# Patient Record
Sex: MALE | Race: WHITE | NOT HISPANIC OR LATINO | Employment: FULL TIME | URBAN - METROPOLITAN AREA
[De-identification: names, ages, dates, MRNs, and addresses within clinical notes are randomized per-mention and may not be internally consistent; named-entity substitution may affect disease eponyms.]

---

## 2023-09-20 ENCOUNTER — OFFICE VISIT (OUTPATIENT)
Dept: ORTHOPEDIC SURGERY | Facility: CLINIC | Age: 33
End: 2023-09-20
Payer: COMMERCIAL

## 2023-09-20 VITALS
BODY MASS INDEX: 24.46 KG/M2 | WEIGHT: 184.6 LBS | SYSTOLIC BLOOD PRESSURE: 120 MMHG | HEIGHT: 73 IN | DIASTOLIC BLOOD PRESSURE: 82 MMHG

## 2023-09-20 DIAGNOSIS — M76.891 POPLITEUS TENDINITIS OF RIGHT LOWER EXTREMITY: Primary | ICD-10-CM

## 2023-09-20 PROCEDURE — 99204 OFFICE O/P NEW MOD 45 MIN: CPT | Performed by: ORTHOPAEDIC SURGERY

## 2023-09-20 RX ORDER — DICLOFENAC SODIUM 75 MG/1
TABLET, DELAYED RELEASE ORAL
COMMUNITY
Start: 2023-09-15

## 2023-09-20 RX ORDER — COLCHICINE 0.6 MG/1
TABLET ORAL
COMMUNITY
Start: 2023-07-13

## 2023-09-20 RX ORDER — FEBUXOSTAT 40 MG/1
TABLET, FILM COATED ORAL
COMMUNITY
Start: 2023-09-19

## 2023-09-20 RX ORDER — NAPROXEN 250 MG/1
250 TABLET ORAL AS NEEDED
COMMUNITY

## 2023-09-20 RX ORDER — IBUPROFEN 200 MG
200 TABLET ORAL EVERY 6 HOURS PRN
COMMUNITY

## 2023-09-20 NOTE — PROGRESS NOTES
[facility] Note    Subjective     Chief Complaint   Patient presents with    Right Knee - Pain        HPI    Norbert Lopez is a 33 y.o. male who presents with new problem of: right knee pain.  Onset: atraumatic and gradual in nature. The issue has been ongoing for 7 months. Pain is a 1/10 on the pain scale. Pain is described as dull. Associated symptoms include pain, swelling, and stiffness. The pain is worse with leisure; resting, ice, heat, medications and elevating the extremity improve the pain. Previous treatments have included: bracing, NSAIDS, oral steroids, and steroid injection (last injection 07/2023).  Pain is on the posterior lateral aspect of the knee.  Occurs intermittently.  Previous episodes of gout in his knee.  No pain anteromedially.  No mechanical symptoms.    I have reviewed the following portions of the patient's history and agree with: History of Present Illness and Review of Systems    There is no problem list on file for this patient.    Past Medical History:   Diagnosis Date    Knee swelling 2023    Gout      Past Surgical History:   Procedure Laterality Date    KNEE SURGERY  06/2009    Left knee ACL Recon      Family History   Problem Relation Age of Onset    Rheumatologic disease Mother         Gout    Rheumatologic disease Brother         Gout     Social History     Socioeconomic History    Marital status: Single   Tobacco Use    Smoking status: Former     Packs/day: 0.50     Years: 10.00     Pack years: 5.00     Types: Cigarettes   Vaping Use    Vaping Use: Never used    Passive vaping exposure: Yes   Substance and Sexual Activity    Alcohol use: Yes     Alcohol/week: 12.0 standard drinks     Types: 12 Cans of beer per week    Drug use: Yes     Types: Marijuana    Sexual activity: Yes     Partners: Female     Birth control/protection: Pill      Current Outpatient Medications on File Prior to Visit   Medication Sig Dispense Refill    colchicine 0.6 MG tablet TAKE 1  TABLET BY MOUTH DAILY FOR GOUT PREVENTION      diclofenac (VOLTAREN) 75 MG EC tablet TAKE 1 TABLET BY MOUTH TWICE DAILY FOR JOINT PAIN OR STIFFNESS      febuxostat (ULORIC) 40 MG tablet       ibuprofen (ADVIL,MOTRIN) 200 MG tablet Take 1 tablet by mouth Every 6 (Six) Hours As Needed.      naproxen (NAPROSYN) 250 MG tablet Take 1 tablet by mouth As Needed.       No current facility-administered medications on file prior to visit.      Allergies   Allergen Reactions    Erythromycin Hives    Allopurinol Rash        Review of Systems   Constitutional:  Negative for activity change, appetite change, chills, diaphoresis, fatigue, fever and unexpected weight change.   HENT:  Negative for congestion, dental problem, drooling, ear discharge, ear pain, facial swelling, hearing loss, mouth sores, nosebleeds, postnasal drip, rhinorrhea, sinus pressure, sneezing, sore throat, tinnitus, trouble swallowing and voice change.    Eyes:  Negative for photophobia, pain, discharge, redness, itching and visual disturbance.   Respiratory:  Negative for apnea, cough, choking, chest tightness, shortness of breath, wheezing and stridor.    Cardiovascular:  Negative for chest pain, palpitations and leg swelling.   Gastrointestinal:  Negative for abdominal distention, abdominal pain, anal bleeding, blood in stool, constipation, diarrhea, nausea, rectal pain and vomiting.   Endocrine: Negative for cold intolerance, heat intolerance, polydipsia, polyphagia and polyuria.   Genitourinary:  Negative for decreased urine volume, difficulty urinating, dysuria, enuresis, flank pain, frequency, genital sores, hematuria and urgency.   Musculoskeletal:  Positive for arthralgias. Negative for back pain, gait problem, joint swelling, myalgias, neck pain and neck stiffness.   Skin:  Negative for color change, pallor, rash and wound.   Allergic/Immunologic: Negative for environmental allergies, food allergies and immunocompromised state.   Neurological:   "Negative for dizziness, tremors, seizures, syncope, facial asymmetry, speech difficulty, weakness, light-headedness, numbness and headaches.   Hematological:  Negative for adenopathy. Does not bruise/bleed easily.   Psychiatric/Behavioral:  Negative for agitation, behavioral problems, confusion, decreased concentration, dysphoric mood, hallucinations, self-injury, sleep disturbance and suicidal ideas. The patient is not nervous/anxious and is not hyperactive.       Objective      Physical Exam  /82   Ht 184.2 cm (72.5\")   Wt 83.7 kg (184 lb 9.6 oz)   BMI 24.69 kg/m²     Body mass index is 24.69 kg/m².    General:   Mental Status:  Alert   Appearance: Cooperative, in no acute distress   Build and Nutrition: Well-nourished well-developed male   Orientation: Alert and oriented to person, place and time   Posture: Normal   Gait: Nonantalgic/normal    Integument:   Right knee: No skin lesions, no rash, no ecchymosis    Neurologic:   Sensation:    Right foot: Intact to light touch on the dorsal and plantar aspect   Motor:  Right lower extremity: 5/5 quadriceps, hamstrings, ankle dorsiflexors, and ankle plantar flexors    Vascular:   Right lower extremity: 2+ dorsalis pedis pulse, prompt capillary refill    Lower Extremities:   Right Knee:    Tenderness:  None    Effusion:  None    Swelling:  None    Crepitus:  None    Atrophy:  None    Range of motion:  Extension: 0°       Flexion: 140°  Instability:  No varus laxity, no valgus laxity, negative anterior drawer  Deformities:  None      Imaging/Studies      Imaging Results (Last 24 Hours)       Procedure Component Value Units Date/Time    XR Knee 3+ View With [place] Right [[id number]] Resulted: 09/20/23 1018     Updated: 09/20/23 1019    Narrative:      Right Knee Radiographs  Indication: right knee pain  Views: AP, lateral, and sunrise views of the right knee    Comparison: no prior studies available    Findings:   No acute or chronic bony abnormalities with good " alignment is seen.             MRI of the right knee from 7/12/2023 from [facility] an open MRI was reviewed, which showed irregularity of the anterior horn of the medial meniscus, possible tear, with tendinopathy of the popliteus tendon.    Assessment and Plan     Diagnoses and all orders for this visit:    1. Popliteus tendinitis of right lower extremity (Primary)  -     XR Knee 3+ View With Sunrise Right        1. Popliteus tendinitis of right lower extremity        I reviewed my findings with the patient.  I did review his MRI from [facility] an open MRI from 7/12/2023.  He is having no symptoms medially, and intermittent symptoms posterolaterally.  I think most of his issues are coming from the popliteus tendon, and have recommended conservative treatment.  The small meniscal abnormality seen on MRI anteromedially may or may not represent a tear, and I will be happy to see him back in the future if he has any worsening symptoms.  Patient was happy for the second opinion today.    Return if symptoms worsen or fail to improve.      Casey Alvarado MD  09/20/23  10:47 EDT

## 2023-10-03 ENCOUNTER — PATIENT ROUNDING (BHMG ONLY) (OUTPATIENT)
Dept: ORTHOPEDIC SURGERY | Facility: CLINIC | Age: 33
End: 2023-10-03
Payer: COMMERCIAL

## 2023-10-03 NOTE — PROGRESS NOTES
October 3, 2023    Teddy Toussaint!    My name is Ronnell Gibbs Supervisor at [facility] located at  [address] [place] [id number]      I am writing  to officially welcome you to our practice and ask about your recent visit.     Tell me about your visit with us. What things went well?        We're always looking for ways to make our patients' experiences even better. Do you have recommendations on ways we may improve?      Overall were you satisfied with your first visit to our practice?        I appreciate you taking the time to answer these questions today. Please let me know if there is anything else I can do for you.       Thank you, and have a great day.

## 2024-05-14 RX ORDER — COLCHICINE 0.6 MG/1
TABLET ORAL
Qty: 30 TABLET | Refills: 0 | Status: SHIPPED | OUTPATIENT
Start: 2024-05-14

## 2024-06-17 RX ORDER — COLCHICINE 0.6 MG/1
TABLET ORAL
Qty: 30 TABLET | Refills: 3 | Status: SHIPPED | OUTPATIENT
Start: 2024-06-17

## 2024-06-17 NOTE — TELEPHONE ENCOUNTER
Rx Refill Note  Requested Prescriptions     Pending Prescriptions Disp Refills    colchicine 0.6 MG tablet [Pharmacy Med Name: Colchicine 0.6mg Tablet] 30 tablet 0     Sig: Take 1 tablet by mouth once daily for gout prevention.      Last office visit with prescribing clinician: 04/17/2024     Last telemedicine visit with prescribing clinician: Visit date not found   Next office visit with prescribing clinician: 10/16/2024                         Would you like a call back once the refill request has been completed: [] Yes [] No    If the office needs to give you a call back, can they leave a voicemail: [] Yes [] No    Armando Corona MA  06/17/24, 09:00 EDT    Last labs 4/25/24

## 2024-08-16 RX ORDER — DICLOFENAC SODIUM 75 MG/1
TABLET, DELAYED RELEASE ORAL
Qty: 60 TABLET | Refills: 1 | Status: SHIPPED | OUTPATIENT
Start: 2024-08-16

## 2024-11-25 ENCOUNTER — OFFICE VISIT (OUTPATIENT)
Age: 34
End: 2024-11-25
Payer: COMMERCIAL

## 2024-11-25 VITALS
HEART RATE: 95 BPM | HEIGHT: 72 IN | SYSTOLIC BLOOD PRESSURE: 132 MMHG | TEMPERATURE: 97.9 F | DIASTOLIC BLOOD PRESSURE: 86 MMHG | WEIGHT: 206.6 LBS | BODY MASS INDEX: 27.98 KG/M2

## 2024-11-25 DIAGNOSIS — R79.89 LFT ELEVATION: ICD-10-CM

## 2024-11-25 DIAGNOSIS — M1A.09X0 CHRONIC GOUT OF MULTIPLE SITES, UNSPECIFIED CAUSE: Primary | ICD-10-CM

## 2024-11-25 DIAGNOSIS — Z98.890 HISTORY OF REPAIR OF ACL: ICD-10-CM

## 2024-11-25 PROCEDURE — 99214 OFFICE O/P EST MOD 30 MIN: CPT | Performed by: INTERNAL MEDICINE

## 2024-11-25 RX ORDER — DICLOFENAC SODIUM 75 MG/1
75 TABLET, DELAYED RELEASE ORAL 2 TIMES DAILY PRN
Qty: 180 TABLET | Refills: 1 | Status: SHIPPED | OUTPATIENT
Start: 2024-11-25

## 2024-11-25 RX ORDER — FEBUXOSTAT 40 MG/1
40 TABLET, FILM COATED ORAL DAILY
Qty: 90 TABLET | Refills: 1 | Status: SHIPPED | OUTPATIENT
Start: 2024-11-25

## 2024-11-25 NOTE — ASSESSMENT & PLAN NOTE
"\"Terry\", single with girlfriend, plays hockey, , travel to [place]   Recurrent episodic severe flares ongoing since 2015 including podagra great toe  Most recent flare right knee early June 2023 for which he was on crutches initially  [facility] drained right knee 6/23 (Jesus Manuel CINTRON) with synovial fluid reportedly positive uric acid crystals  Labs 6/8/23:  Uric acid 8.3, normal CBC, normal CMP  X-ray right knee 6/8/23-joint effusion, MRI right knee June 2023  +Brother and mother with gout  **Current:  Febuxostat 40 mg start 7/13/23, as needed colchicine, diclofenac  Prior intolerance allopurinol 2021 with rash hand.      No recent gout flares.  Doing very well clinically  Uric acid 6.7 on 4/25/2024  Doing well today on febuxostat 40 mg daily.  Continue this.  Refilled.  -just take the colchicine and diclofenac as needed  Labs ordered today for monitoring as below  -Follow-up PCP for mild LFT elevation history     Recommend Mediterranean diet and avoidance of alcohol, shellfish, red meat, soda, processed foods, sugar which can all trigger gout flares.  Goal uric acid in gout is below 6, which is the saturation point of uric acid.  Patient can use steroid taper as needed for acute flares.  Return to clinic 6 months      Orders:    febuxostat (ULORIC) 40 MG tablet; Take 1 tablet by mouth Daily.    diclofenac (VOLTAREN) 75 MG EC tablet; Take 1 tablet by mouth 2 (Two) Times a Day As Needed (joint pain).    C-reactive Protein; Future    CBC Auto Differential; Future    Comprehensive Metabolic Panel; Future    Sedimentation Rate; Future    Uric Acid; Future    "

## 2024-11-25 NOTE — PROGRESS NOTES
Office Follow Up      Date: 11/25/2024   Patient Name: Norbert Lopez  MRN: [id number]  YOB: 1990    Referring Physician: Ramesh Shelton A*     Chief Complaint:   Chief Complaint   Patient presents with    Gout       History of Present Illness: Norbert Lopez is a 34 y.o. male who is here today for follow up on gout    Patient reports longstanding history episodic gouty flares dating back to 2015.  He reports that the flares typically occur episodically with sudden onset, usually involving one lower extremity joint at a time ,often his great toe or knee.  He notes that eating pork products seemed to trigger flares.  His most recent flare started right knee early June 2023 with sudden onset pain stiffness swelling in the right knee.  No injury to the knee.  He initially had to use crutches because of the severity of the pain in the right knee.  Touching it with a sheet was painful.  The right knee was initially swollen and hot with a effusion.  He went to urgent care twice as well as his PCP about this severe knee pain.  He was given prednisone with partial improvement in the knee pain and swelling.  He then consulted with [facility] in June 2023 and had effusion drained from right knee.  Reportedly synovial fluid from the knee showed evidence of uric acid crystals.  Labs with his PCP June 2023 showed elevated uric acid 8.3.  He has had no pain or swelling in the upper extremity joints.  No psoriasis.  His mother and brother also suffer from gout.  He does drink alcohol but not excessively.    He uses tart cherry juice supplement over-the-counter.   He reports prior intolerance to allopurinol due to rash on his hand in 2021.    Interim 11/25/2024:  Doing well today on febuxostat and as needed colchicine and diclofenac.  No gout flares in the interim  No side effects to medicine.        Subjective       Review of Systems: Review of Systems   Constitutional:   Negative for chills, fatigue, fever and unexpected weight loss.   HENT:  Negative for mouth sores, sinus pressure and sore throat.    Eyes:  Negative for pain and redness.   Respiratory:  Negative for cough and shortness of breath.    Cardiovascular:  Negative for chest pain.   Gastrointestinal:  Negative for abdominal pain, blood in stool, diarrhea, nausea, vomiting and GERD.   Endocrine: Negative for polydipsia and polyuria.   Genitourinary:  Negative for dysuria, genital sores and hematuria.   Musculoskeletal:  Negative for arthralgias, back pain, joint swelling, myalgias, neck pain and neck stiffness.   Skin:  Negative for rash and bruise.   Allergic/Immunologic: Negative for immunocompromised state.   Neurological:  Negative for seizures, weakness, numbness and memory problem.   Hematological:  Negative for adenopathy. Does not bruise/bleed easily.   Psychiatric/Behavioral:  Negative for depressed mood. The patient is not nervous/anxious.         Medications:   Current Outpatient Medications:     colchicine 0.6 MG tablet, Take 1 tablet by mouth once daily for gout prevention., Disp: 30 tablet, Rfl: 3    diclofenac (VOLTAREN) 75 MG EC tablet, Take 1 tablet by mouth 2 (Two) Times a Day As Needed (joint pain)., Disp: 180 tablet, Rfl: 1    febuxostat (ULORIC) 40 MG tablet, Take 1 tablet by mouth Daily., Disp: 90 tablet, Rfl: 1    omeprazole (priLOSEC) 40 MG capsule, Take 1 capsule by mouth Daily. Before a meal, Disp: , Rfl:     Allergies:   Allergies   Allergen Reactions    Allopurinol Rash    Erythromycin Hives       I have reviewed and updated the patient's chief complaint, history of present illness, review of systems, past medical history, surgical history, family history, social history, medications and allergy list as appropriate.     Objective        Vital Signs:   Vitals:    11/25/24 1637   BP: 132/86   BP Location: Left arm   Patient Position: Sitting   Cuff Size: Adult   Pulse: 95   Temp: 97.9 °F (36.6  "°C)   Weight: 93.7 kg (206 lb 9.6 oz)   Height: 182.9 cm (72\")   PainSc:   1     Body mass index is 28.02 kg/m².      Physical Exam:  Physical Exam   MUSCULOSKELETAL:   No peripheral synovitis    Complete joint exam was performed including the MCPs, PIPs, DIPs of the hands, wrists, elbows, shoulders, hips, knees and ankles.  No soft tissue swelling or tenderness is present except as above.    General: The patient is well-developed and well nourished. Cooperative, alert and oriented. Affect is normal. Hydration appears normal.   HEENT: Normocephalic and atraumatic. Lids and conjunctiva are normal. Pupils are equal and sclera are clear. Oropharynx is clear   NECK neck is supple without adenopathy, masses or thyromegaly.   CARDIOVASCULAR: Regular rate and rhythm. No murmurs, rubs or gallops   LUNGS: Effort is normal. Lungs are clear bilateral   ABDOMEN: Not examined  EXTREMITIES: Peripheral pulses are intact. No clubbing.   SKIN: No rashes. No subcutaneous nodules. No digital ulcers. No sclerodactyly.   NEUROLOGIC: Gait is normal. Strength testing is normal.  No focal neurologic deficits    Results Review:   Labs:   No results found for: \"GLUCOSE\", \"BUN\", \"CREATININE\", \"EGFRRESULT\", \"EGFR\", \"BCR\", \"K\", \"CO2\", \"CALCIUM\", \"PROTENTOTREF\", \"ALBUMIN\", \"BILITOT\", \"AST\", \"ALT\"  No results found for: \"WBC\", \"HGB\", \"HCT\", \"MCV\", \"PLT\"  No results found for: \"SEDRATE\"  No results found for: \"CRP\"  No results found for: \"QUANTIFERO\", \"QUANTITB1\", \"QUANTITB2\", \"QUANTIFERN\", \"QUANTIFERM\", \"QUANTITBGLDP\"  No results found for: \"RF\"  No results found for: \"HEPBSAG\", \"HEPAIGM\", \"HEPBIGMCORE\", \"HEPCVIRUSABY\"      Procedures    Assessment / Plan        Assessment & Plan  Chronic gout of multiple sites, unspecified cause  \"Terry\", single with girlfriend, plays hockey, , travel to [place]   Recurrent episodic severe flares ongoing since 2015 including podagra great toe  Most recent flare right knee early June 2023 for " which he was on crutches initially  [facility] drained right knee 6/23 (Jesus Manuel CINTRON) with synovial fluid reportedly positive uric acid crystals  Labs 6/8/23:  Uric acid 8.3, normal CBC, normal CMP  X-ray right knee 6/8/23-joint effusion, MRI right knee June 2023  +Brother and mother with gout  **Current:  Febuxostat 40 mg start 7/13/23, as needed colchicine, diclofenac  Prior intolerance allopurinol 2021 with rash hand.      No recent gout flares.  Doing very well clinically  Uric acid 6.7 on 4/25/2024  Doing well today on febuxostat 40 mg daily.  Continue this.  Refilled.  -just take the colchicine and diclofenac as needed  Labs ordered today for monitoring as below  -Follow-up PCP for mild LFT elevation history     Recommend Mediterranean diet and avoidance of alcohol, shellfish, red meat, soda, processed foods, sugar which can all trigger gout flares.  Goal uric acid in gout is below 6, which is the saturation point of uric acid.  Patient can use steroid taper as needed for acute flares.  Return to clinic 6 months      Orders:    febuxostat (ULORIC) 40 MG tablet; Take 1 tablet by mouth Daily.    diclofenac (VOLTAREN) 75 MG EC tablet; Take 1 tablet by mouth 2 (Two) Times a Day As Needed (joint pain).    C-reactive Protein; Future    CBC Auto Differential; Future    Comprehensive Metabolic Panel; Future    Sedimentation Rate; Future    Uric Acid; Future    History of repair of ACL  History of 2009 left knee ACL repair; orthopedics dr zuñiga.       LFT elevation  Labs 4/25/2024: Negative viral hepatitis panel,negative JEREL, normal iron studies, negative mitochondrial antibody/smooth muscle antibody    Mild LFT elevation ALT/AST 10/12/2023 and 4/25/2025 labs  - Avoid alcohol  Follow-up with PCP           Follow Up:   Return in about 6 months (around 5/25/2025) for Followup APRN.        Simón Jeter MD  [facility]

## 2025-03-28 DIAGNOSIS — M1A.09X0 CHRONIC GOUT OF MULTIPLE SITES, UNSPECIFIED CAUSE: ICD-10-CM

## 2025-03-28 RX ORDER — FEBUXOSTAT 40 MG/1
TABLET, FILM COATED ORAL
Qty: 90 TABLET | Refills: 0 | Status: SHIPPED | OUTPATIENT
Start: 2025-03-28

## 2025-05-29 ENCOUNTER — TELEPHONE (OUTPATIENT)
Age: 35
End: 2025-05-29
Payer: COMMERCIAL

## 2025-05-29 DIAGNOSIS — M1A.09X0 CHRONIC GOUT OF MULTIPLE SITES, UNSPECIFIED CAUSE: ICD-10-CM

## 2025-05-29 RX ORDER — DICLOFENAC SODIUM 75 MG/1
75 TABLET, DELAYED RELEASE ORAL 2 TIMES DAILY PRN
Qty: 180 TABLET | Refills: 1 | Status: SHIPPED | OUTPATIENT
Start: 2025-05-29

## 2025-05-29 RX ORDER — COLCHICINE 0.6 MG/1
TABLET ORAL
Qty: 30 TABLET | Refills: 2 | OUTPATIENT
Start: 2025-05-29

## 2025-05-29 RX ORDER — FEBUXOSTAT 40 MG/1
40 TABLET, FILM COATED ORAL DAILY
Qty: 90 TABLET | Refills: 0 | Status: SHIPPED | OUTPATIENT
Start: 2025-05-29

## 2025-05-29 RX ORDER — COLCHICINE 0.6 MG/1
0.6 TABLET ORAL DAILY PRN
Qty: 30 TABLET | Refills: 3 | Status: SHIPPED | OUTPATIENT
Start: 2025-05-29

## 2025-05-29 NOTE — TELEPHONE ENCOUNTER
- Patient last seen 11/24.  I sent refills on colchicine, diclofenac, febuxostat for the next 90 days while he is searching for a new job and trying to establish new health insurance  - Recommend update basic labs now CBC CMP uric acid for monitoring as last labs were a year ago.  Please send him lab order

## 2025-05-29 NOTE — TELEPHONE ENCOUNTER
Declining colchicine refill. Patient needs to schedule appointment. Canceled 5/27 and has not rescheduled, notes indicate he is currently without health insurance.     HUB OK TO RELAY

## 2025-05-29 NOTE — TELEPHONE ENCOUNTER
See patient's message regarding refill requests. He canceled his appointment because he is currently without insurance.